# Patient Record
Sex: FEMALE | Race: WHITE | NOT HISPANIC OR LATINO
[De-identification: names, ages, dates, MRNs, and addresses within clinical notes are randomized per-mention and may not be internally consistent; named-entity substitution may affect disease eponyms.]

---

## 2018-12-13 PROBLEM — Z00.00 ENCOUNTER FOR PREVENTIVE HEALTH EXAMINATION: Status: ACTIVE | Noted: 2018-12-13

## 2018-12-17 ENCOUNTER — APPOINTMENT (OUTPATIENT)
Age: 83
End: 2018-12-17
Payer: MEDICARE

## 2018-12-17 VITALS
OXYGEN SATURATION: 94 % | DIASTOLIC BLOOD PRESSURE: 90 MMHG | SYSTOLIC BLOOD PRESSURE: 140 MMHG | HEIGHT: 61 IN | BODY MASS INDEX: 24.73 KG/M2 | HEART RATE: 84 BPM | WEIGHT: 131 LBS

## 2018-12-17 DIAGNOSIS — Z87.39 PERSONAL HISTORY OF OTHER DISEASES OF THE MUSCULOSKELETAL SYSTEM AND CONNECTIVE TISSUE: ICD-10-CM

## 2018-12-17 DIAGNOSIS — Z86.39 PERSONAL HISTORY OF OTHER ENDOCRINE, NUTRITIONAL AND METABOLIC DISEASE: ICD-10-CM

## 2018-12-17 DIAGNOSIS — I10 ESSENTIAL (PRIMARY) HYPERTENSION: ICD-10-CM

## 2018-12-17 PROCEDURE — 99202 OFFICE O/P NEW SF 15 MIN: CPT

## 2018-12-17 RX ORDER — PRAVASTATIN SODIUM 10 MG/1
10 TABLET ORAL
Refills: 0 | Status: ACTIVE | COMMUNITY

## 2018-12-17 RX ORDER — OLMESARTAN MEDOXOMIL 20 MG/1
20 TABLET, FILM COATED ORAL
Refills: 0 | Status: ACTIVE | COMMUNITY

## 2018-12-17 RX ORDER — AMLODIPINE AND ATORVASTATIN 5; 80 MG/1; MG/1
5-80 TABLET, COATED ORAL
Refills: 0 | Status: ACTIVE | COMMUNITY

## 2019-01-28 ENCOUNTER — APPOINTMENT (OUTPATIENT)
Dept: SURGERY | Facility: CLINIC | Age: 84
End: 2019-01-28
Payer: MEDICARE

## 2019-01-28 VITALS
WEIGHT: 128 LBS | HEART RATE: 69 BPM | OXYGEN SATURATION: 98 % | HEIGHT: 61 IN | DIASTOLIC BLOOD PRESSURE: 78 MMHG | BODY MASS INDEX: 24.17 KG/M2 | SYSTOLIC BLOOD PRESSURE: 145 MMHG

## 2019-01-28 PROCEDURE — 99213 OFFICE O/P EST LOW 20 MIN: CPT

## 2019-01-28 NOTE — PHYSICAL EXAM
[de-identified] : Elderly female looking younger than her stated age ambulatory in no discomfort [de-identified] : Small cyst 0.75 centimeter by 0.5 cm cyst on the left pinna nontender and not infected. 2 small cyst 1 cm in diameter over the scalp in both right and left parietal areas. Both cysts were mobile and nontender

## 2019-01-28 NOTE — REASON FOR VISIT
[Follow-Up: _____] : a [unfilled] follow-up visit [Source: ______] : History obtained from [unfilled] [FreeTextEntry1] : Mrs Estrada is here today to discuss surgery to remove scalp cyst

## 2019-01-28 NOTE — ASSESSMENT
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 Patient's present for followup and wants cyst excised. Patient was explained and the schedule outpatient surgery under local anesthesia. Informed consent was signed and all instructions given.

## 2019-01-28 NOTE — DATA REVIEWED
[FreeTextEntry1] : her blood work was done in the outpatient lab patient showing normal values was discussed with  patient

## 2019-03-06 ENCOUNTER — RESULT REVIEW (OUTPATIENT)
Age: 84
End: 2019-03-06

## 2019-03-06 ENCOUNTER — OUTPATIENT (OUTPATIENT)
Dept: OUTPATIENT SERVICES | Facility: HOSPITAL | Age: 84
LOS: 1 days | Discharge: HOME | End: 2019-03-06

## 2019-03-06 ENCOUNTER — APPOINTMENT (OUTPATIENT)
Age: 84
End: 2019-03-06
Payer: MEDICARE

## 2019-03-06 VITALS
HEART RATE: 56 BPM | SYSTOLIC BLOOD PRESSURE: 144 MMHG | TEMPERATURE: 97 F | OXYGEN SATURATION: 97 % | WEIGHT: 126.99 LBS | DIASTOLIC BLOOD PRESSURE: 63 MMHG | RESPIRATION RATE: 17 BRPM | HEIGHT: 61 IN

## 2019-03-06 VITALS — HEART RATE: 63 BPM | RESPIRATION RATE: 16 BRPM | DIASTOLIC BLOOD PRESSURE: 70 MMHG | SYSTOLIC BLOOD PRESSURE: 153 MMHG

## 2019-03-06 DIAGNOSIS — Z98.890 OTHER SPECIFIED POSTPROCEDURAL STATES: Chronic | ICD-10-CM

## 2019-03-06 PROCEDURE — 11422 EXC H-F-NK-SP B9+MARG 1.1-2: CPT

## 2019-03-06 PROCEDURE — 11441 EXC FACE-MM B9+MARG 0.6-1 CM: CPT | Mod: 59

## 2019-03-06 RX ORDER — OLMESARTAN MEDOXOMIL 5 MG/1
0 TABLET, FILM COATED ORAL
Qty: 0 | Refills: 0 | COMMUNITY

## 2019-03-06 RX ORDER — NIFEDIPINE 30 MG
0 TABLET, EXTENDED RELEASE 24 HR ORAL
Qty: 0 | Refills: 0 | COMMUNITY

## 2019-03-06 NOTE — BRIEF OPERATIVE NOTE - OPERATION/FINDINGS
Excision of inclusion cyst of left ear and pilar cyst of left scalp  Upon incision and attempted extraction of second pilar cyst (located on right scalp), no cyst was appreciated and nodule was detrmined to be outgrowth of bone Excision of inclusion cyst of left ear and pilar cyst of left scalp  Upon incision and attempted extraction of second pilar cyst (located on right scalp), no cyst was appreciated and nodule was determined to be bony prominence

## 2019-03-06 NOTE — BRIEF OPERATIVE NOTE - PRE-OP DX
Inclusion cyst  03/06/2019  of left ear  Active  Zan Myers  Pilar cyst  03/06/2019  Pilar cyst of scalp x2  Active  Zan Myers

## 2019-03-06 NOTE — BRIEF OPERATIVE NOTE - PROCEDURE
<<-----Click on this checkbox to enter Procedure Excision, inclusion cyst  03/06/2019  left ear  Active  [email or web address]  Excision, pilar cyst, scalp  03/06/2019    Active  [email or web address]

## 2019-03-06 NOTE — ASU DISCHARGE PLAN (ADULT/PEDIATRIC). - SPECIAL INSTRUCTIONS
As above, please follow up at your scheduled appointment with Dr. Petersen, if there are any issues, please call the number below    Please reapply bacitracin to wounds 2x daily    Please keep affected areas clean and dry, may resume showering in 48h    Please take tylenol and motrin as needed/directed

## 2019-03-06 NOTE — BRIEF OPERATIVE NOTE - POST-OP DX
Inclusion cyst  03/06/2019  inclusion cyst of left ear  Active  Zan Myers  Pilar cyst  03/06/2019  pialr cyst of left scalp  Active  Zan Myers Inclusion cyst  03/06/2019  inclusion cyst of left ear  Active  Zan Myers  Pilar cyst  03/06/2019  Pilar cyst of left scalp  Active  Garry Petersen

## 2019-03-08 LAB — SURGICAL PATHOLOGY STUDY: SIGNIFICANT CHANGE UP

## 2019-03-11 ENCOUNTER — APPOINTMENT (OUTPATIENT)
Age: 84
End: 2019-03-11
Payer: MEDICARE

## 2019-03-11 DIAGNOSIS — L72.0 EPIDERMAL CYST: ICD-10-CM

## 2019-03-11 DIAGNOSIS — L72.9 FOLLICULAR CYST OF THE SKIN AND SUBCUTANEOUS TISSUE, UNSPECIFIED: ICD-10-CM

## 2019-03-11 PROCEDURE — 99024 POSTOP FOLLOW-UP VISIT: CPT

## 2019-03-11 RX ORDER — NIFEDIPINE 60 MG/1
60 TABLET, EXTENDED RELEASE ORAL
Refills: 0 | Status: ACTIVE | COMMUNITY

## 2019-03-11 NOTE — REASON FOR VISIT
[Post Op: _________] : a [unfilled] post op visit [Source: ______] : History obtained from [unfilled] [FreeTextEntry1] : patient comes for suture removal

## 2019-03-11 NOTE — HISTORY OF PRESENT ILLNESS
[de-identified] : This 83-year-old [place] female was hospitalized for ambulatory surgery and  underwent excision of the Pilar cyst of scalp and epidermal inclusion cyst of the left ear

## 2019-03-11 NOTE — DATA REVIEWED
[FreeTextEntry1] : Pathology report was reviewed with patient showing benign epidermal cyst and pilar cyst of the scalp,

## 2019-03-11 NOTE — PHYSICAL EXAM
[de-identified] : Elderly female ambulatory in no discomfort [de-identified] : Healing wound over the scalp and over the left ear. Wounds clean with no signs of infection and all sutures were removed

## 2019-03-11 NOTE — ASSESSMENT
[FreeTextEntry1] : Satisfactory postoperative course following excision of the cyst with no signs of infection

## 2019-03-12 DIAGNOSIS — H40.9 UNSPECIFIED GLAUCOMA: ICD-10-CM

## 2019-03-12 DIAGNOSIS — E78.00 PURE HYPERCHOLESTEROLEMIA, UNSPECIFIED: ICD-10-CM

## 2019-03-12 DIAGNOSIS — L72.11 PILAR CYST: ICD-10-CM

## 2019-03-12 DIAGNOSIS — I10 ESSENTIAL (PRIMARY) HYPERTENSION: ICD-10-CM

## 2019-03-12 DIAGNOSIS — L72.0 EPIDERMAL CYST: ICD-10-CM

## 2024-01-01 NOTE — HISTORY OF PRESENT ILLNESS
[de-identified] : This 83-year-old healthy looking female presents for evaluation of the new cyst on the scalp. Patient also noted a small cyst on the left ear pinna as it was pointed out to her by her . Patient denies any pain or discomfort at this time. Patient states that she is undergoing workup for her high blood pressure and her medications recently changed and is awaiting MRI with contrast for kidney to rule out other causes of hypertension. Patient's states that cysts on the scalp has been growing slowly in size. 3